# Patient Record
Sex: FEMALE | NOT HISPANIC OR LATINO | Employment: OTHER | URBAN - METROPOLITAN AREA
[De-identification: names, ages, dates, MRNs, and addresses within clinical notes are randomized per-mention and may not be internally consistent; named-entity substitution may affect disease eponyms.]

---

## 2022-08-03 ENCOUNTER — MEDICAL CORRESPONDENCE (OUTPATIENT)
Dept: HEALTH INFORMATION MANAGEMENT | Facility: CLINIC | Age: 67
End: 2022-08-03

## 2022-08-09 ENCOUNTER — TRANSCRIBE ORDERS (OUTPATIENT)
Dept: OTHER | Age: 67
End: 2022-08-09

## 2022-08-09 DIAGNOSIS — M65.341 TRIGGER RING FINGER OF RIGHT HAND: Primary | ICD-10-CM

## 2022-08-15 NOTE — TELEPHONE ENCOUNTER
DIAGNOSIS: complex trigger finger, right hand    APPOINTMENT DATE: 09/16/2022   NOTES STATUS DETAILS   OFFICE NOTE from referring provider Care Everywhere 08/09/2022 [facility]  08/03/2022 [facility]   OFFICE NOTE from other specialist N/A    DISCHARGE SUMMARY from hospital N/A    DISCHARGE REPORT from the ER N/A    OPERATIVE REPORT N/A    MEDICATION LIST N/A    EMG (for Spine) N/A    IMPLANT RECORD/STICKER N/A    LABS     CBC/DIFF N/A    CULTURES N/A    INJECTIONS DONE IN RADIOLOGY N/A    MRI N/A    CT SCAN N/A    XRAYS (IMAGES & REPORTS) N/A    TUMOR     PATHOLOGY  Slides & report N/A

## 2022-09-16 ENCOUNTER — PRE VISIT (OUTPATIENT)
Dept: ORTHOPEDICS | Facility: CLINIC | Age: 67
End: 2022-09-16

## 2022-09-16 ENCOUNTER — OFFICE VISIT (OUTPATIENT)
Dept: ORTHOPEDICS | Facility: CLINIC | Age: 67
End: 2022-09-16
Payer: COMMERCIAL

## 2022-09-16 ENCOUNTER — TELEPHONE (OUTPATIENT)
Dept: ORTHOPEDICS | Facility: CLINIC | Age: 67
End: 2022-09-16

## 2022-09-16 DIAGNOSIS — M65.341 TRIGGER RING FINGER OF RIGHT HAND: ICD-10-CM

## 2022-09-16 PROCEDURE — 99204 OFFICE O/P NEW MOD 45 MIN: CPT | Performed by: STUDENT IN AN ORGANIZED HEALTH CARE EDUCATION/TRAINING PROGRAM

## 2022-09-16 RX ORDER — IBUPROFEN 200 MG
800 TABLET ORAL EVERY 8 HOURS PRN
COMMUNITY

## 2022-09-16 ASSESSMENT — PAIN SCALES - GENERAL: PAINLEVEL: MODERATE PAIN (5)

## 2022-09-16 NOTE — LETTER
9/16/2022         RE: Joann Taveras  [address] [address]        Dear Colleague,    Thank you for referring your patient, Joann Taveras, to the [facility]. Please see a copy of my visit note below.    Ortho Hand    HPI: 67 year old RHD NS p/w R RF triggering. This has been ongoing for >5 years. She had 2-3 prior steroid injections, with the most recent >1 year ago. The triggering has returned. It is affecting her ability to  items. She seeks an opinion.     ROS: Negative, see HPI  PMH: None, nondiabetic  PSH: No prior surgeries to the hands or wrists  Medications: No blood thinners  Allergies: None  SH: Nonsmoker, denies any tobacco or nicotine use  FH: No bleeding or clotting issues, or problems with anesthesia    Examination:  Nonlabored breathing  Not distressed  R RF A1 pulley exquisite tenderness with grade II triggering  No R digit extensor subluxation  No Dupuytren's disease    A/P: 67 year old RHD NS p/w R RF triggering    -Discussed the etiology, natural history and treatment options for trigger finger. Options for treatment include stretching, massage, steroid injection, surgery. Since the patient has failed conservative management for years, including several steroid injections, it is reasonable to proceed with surgery. Patient would prefer to have surgery.   -Discussed the risks of surgery, including but not limited to: infection, bleeding, pain, poor scarring, injury to nerves or tendons, neuroma formation, recurrence, need for revision surgery, wound healing issues, complex regional pain syndrome. Despite these risks, patient consents to and would like to proceed with scheduling of RIGHT ring finger A1 pulley release.   -MAC sedation and local anesthesia   -Will place case request. Patient seeks surgery later in the year.   -A total of 45 minutes was devoted to review of chart, direct face-to-face patient counseling and documentation during  this encounter    Carroll Maxwell MD, PhD        Again, thank you for allowing me to participate in the care of your patient.        Sincerely,        Carroll Maxwell MD

## 2022-09-16 NOTE — TELEPHONE ENCOUNTER
Procedure: Right ring finger trigger release  Facility: [facility]  Length: 45? minutes  Anesthesia: Local, MAC  Post-op appointments needed: 2 weeks provider or with Renée, 6 weeks with provider only.  Surgery packet/instructions given to patient?  Yes , but not reviewed    Elton Kim RN

## 2022-09-16 NOTE — NURSING NOTE
Chief Complaint   Patient presents with     Right Hand - Pain, New Patient       There were no vitals filed for this visit.    There is no height or weight on file to calculate BMI.      TONI Kamara NREMT

## 2022-09-17 NOTE — PROGRESS NOTES
Ortho Hand    HPI: 67 year old RHD NS p/w R RF triggering. This has been ongoing for >5 years. She had 2-3 prior steroid injections, with the most recent >1 year ago. The triggering has returned. It is affecting her ability to  items. She seeks an opinion.     ROS: Negative, see HPI  PMH: None, nondiabetic  PSH: No prior surgeries to the hands or wrists  Medications: No blood thinners  Allergies: None  SH: Nonsmoker, denies any tobacco or nicotine use  FH: No bleeding or clotting issues, or problems with anesthesia    Examination:  Nonlabored breathing  Not distressed  R RF A1 pulley exquisite tenderness with grade II triggering  No R digit extensor subluxation  No Dupuytren's disease    A/P: 67 year old RHD NS p/w R RF triggering    -Discussed the etiology, natural history and treatment options for trigger finger. Options for treatment include stretching, massage, steroid injection, surgery. Since the patient has failed conservative management for years, including several steroid injections, it is reasonable to proceed with surgery. Patient would prefer to have surgery.   -Discussed the risks of surgery, including but not limited to: infection, bleeding, pain, poor scarring, injury to nerves or tendons, neuroma formation, recurrence, need for revision surgery, wound healing issues, complex regional pain syndrome. Despite these risks, patient consents to and would like to proceed with scheduling of RIGHT ring finger A1 pulley release.   -MAC sedation and local anesthesia   -Will place case request. Patient seeks surgery later in the year.   -A total of 45 minutes was devoted to review of chart, direct face-to-face patient counseling and documentation during this encounter    Carroll Maxwell MD, PhD

## 2022-09-19 PROBLEM — M65.341 TRIGGER RING FINGER OF RIGHT HAND: Status: ACTIVE | Noted: 2022-09-19

## 2022-09-19 NOTE — TELEPHONE ENCOUNTER
Spoke with patient and scheduled surgery for 12/21 in  - patient choice of date.    H&P with PCP.    COVID test to be done 1-2 days before the surgery.    Post-op scheduled for 1/6.    Surg packet given in clinic - not reviewed.     No further questions/cocnerns at this time.

## 2022-12-08 ENCOUNTER — TELEPHONE (OUTPATIENT)
Dept: SURGERY | Facility: CLINIC | Age: 67
End: 2022-12-08

## 2022-12-08 NOTE — TELEPHONE ENCOUNTER
Message left for the patient to call back to reschedule surgery date or location.  Maria G Youssef, Surgery Scheduling Coordinator

## 2022-12-22 ENCOUNTER — ANESTHESIA EVENT (OUTPATIENT)
Dept: SURGERY | Facility: AMBULATORY SURGERY CENTER | Age: 67
End: 2022-12-22
Payer: COMMERCIAL

## 2022-12-22 NOTE — ANESTHESIA PREPROCEDURE EVALUATION
Anesthesia Pre-Procedure Evaluation    Patient: Joann Taveras   MRN: [id number] : 1955        Procedure : Procedure(s):  RIGHT RING FINGER A1 PULLEY RELEASE          History reviewed. No pertinent past medical history.   Past Surgical History:   Procedure Laterality Date     ORTHOPEDIC SURGERY        Allergies   Allergen Reactions     Penicillins Hives and Rash      Social History     Tobacco Use     Smoking status: Never     Smokeless tobacco: Never   Substance Use Topics     Alcohol use: Yes      Wt Readings from Last 1 Encounters:   22 68 kg (149 lb 14.6 oz)        Anesthesia Evaluation   Pt has had prior anesthetic. Type: General.    No history of anesthetic complications       ROS/MED HX  ENT/Pulmonary:    (-) sleep apnea   Neurologic:    (-) no seizures and no CVA   Cardiovascular:    (-) hypertension and murmur   METS/Exercise Tolerance: >4 METS    Hematologic:       Musculoskeletal:       GI/Hepatic:    (-) GERD and liver disease   Renal/Genitourinary:    (-) renal disease   Endo:    (-) Type II DM   Psychiatric/Substance Use:       Infectious Disease:       Malignancy:       Other:            Physical Exam    Airway        Mallampati: I   TM distance: > 3 FB   Neck ROM: full   Mouth opening: > 3 cm    Respiratory Devices and Support         Dental  no notable dental history         Cardiovascular          Rhythm and rate: regular and normal (-) no murmur    Pulmonary   pulmonary exam normal        breath sounds clear to auscultation           OUTSIDE LABS:  CBC: No results found for: WBC, HGB, HCT, PLT  BMP: No results found for: NA, POTASSIUM, CHLORIDE, CO2, BUN, CR, GLC  COAGS: No results found for: PTT, INR, FIBR  POC: No results found for: BGM, HCG, HCGS  HEPATIC: No results found for: ALBUMIN, PROTTOTAL, ALT, AST, GGT, ALKPHOS, BILITOTAL, BILIDIRECT, MARYJANE  OTHER: No results found for: PH, LACT, A1C, PALMIRA, PHOS, MAG, LIPASE, AMYLASE, TSH, T4, T3, CRP, SED    Anesthesia Plan    ASA  Status:  1   NPO Status:  NPO Appropriate    Anesthesia Type: MAC.     - Reason for MAC: immobility needed   Induction: Intravenous, Propofol.   Maintenance: TIVA.        Consents    Anesthesia Plan(s) and associated risks, benefits, and realistic alternatives discussed. Questions answered and patient/representative(s) expressed understanding.    - Discussed:     - Discussed with:  Patient      - Extended Intubation/Ventilatory Support Discussed: No.      - Patient is DNR/DNI Status: No    Use of blood products discussed: No .     Postoperative Care    Pain management: IV analgesics.   PONV prophylaxis: Ondansetron (or other 5HT-3), Background Propofol Infusion     Comments:    Other Comments: Discussed plan for IV anesthetic with native airway. Discussed potential need for conversion to general anesthetic with airway management and potential for recall.              Jeff Fishman MD

## 2022-12-23 ENCOUNTER — HOSPITAL ENCOUNTER (OUTPATIENT)
Facility: AMBULATORY SURGERY CENTER | Age: 67
Discharge: HOME | End: 2022-12-23
Attending: STUDENT IN AN ORGANIZED HEALTH CARE EDUCATION/TRAINING PROGRAM | Admitting: STUDENT IN AN ORGANIZED HEALTH CARE EDUCATION/TRAINING PROGRAM
Payer: COMMERCIAL

## 2022-12-23 ENCOUNTER — ANESTHESIA (OUTPATIENT)
Dept: SURGERY | Facility: AMBULATORY SURGERY CENTER | Age: 67
End: 2022-12-23
Payer: COMMERCIAL

## 2022-12-23 VITALS
DIASTOLIC BLOOD PRESSURE: 66 MMHG | SYSTOLIC BLOOD PRESSURE: 109 MMHG | RESPIRATION RATE: 16 BRPM | OXYGEN SATURATION: 95 % | WEIGHT: 149.91 LBS | HEART RATE: 68 BPM | TEMPERATURE: 97 F

## 2022-12-23 DIAGNOSIS — M65.341 TRIGGER RING FINGER OF RIGHT HAND: ICD-10-CM

## 2022-12-23 PROCEDURE — G8907 PT DOC NO EVENTS ON DISCHARG: HCPCS

## 2022-12-23 PROCEDURE — G8918 PT W/O PREOP ORDER IV AB PRO: HCPCS

## 2022-12-23 PROCEDURE — 26055 INCISE FINGER TENDON SHEATH: CPT | Mod: F8 | Performed by: STUDENT IN AN ORGANIZED HEALTH CARE EDUCATION/TRAINING PROGRAM

## 2022-12-23 PROCEDURE — 26055 INCISE FINGER TENDON SHEATH: CPT | Mod: F8

## 2022-12-23 RX ORDER — ONDANSETRON 2 MG/ML
4 INJECTION INTRAMUSCULAR; INTRAVENOUS EVERY 30 MIN PRN
Status: DISCONTINUED | OUTPATIENT
Start: 2022-12-23 | End: 2022-12-24 | Stop reason: HOSPADM

## 2022-12-23 RX ORDER — FENTANYL CITRATE 50 UG/ML
50 INJECTION, SOLUTION INTRAMUSCULAR; INTRAVENOUS EVERY 5 MIN PRN
Status: DISCONTINUED | OUTPATIENT
Start: 2022-12-23 | End: 2022-12-24 | Stop reason: HOSPADM

## 2022-12-23 RX ORDER — LIDOCAINE HYDROCHLORIDE 20 MG/ML
INJECTION, SOLUTION INFILTRATION; PERINEURAL PRN
Status: DISCONTINUED | OUTPATIENT
Start: 2022-12-23 | End: 2022-12-23

## 2022-12-23 RX ORDER — ONDANSETRON 2 MG/ML
INJECTION INTRAMUSCULAR; INTRAVENOUS PRN
Status: DISCONTINUED | OUTPATIENT
Start: 2022-12-23 | End: 2022-12-23

## 2022-12-23 RX ORDER — PROPOFOL 10 MG/ML
INJECTION, EMULSION INTRAVENOUS PRN
Status: DISCONTINUED | OUTPATIENT
Start: 2022-12-23 | End: 2022-12-23

## 2022-12-23 RX ORDER — ONDANSETRON 4 MG/1
4 TABLET, ORALLY DISINTEGRATING ORAL EVERY 30 MIN PRN
Status: DISCONTINUED | OUTPATIENT
Start: 2022-12-23 | End: 2022-12-24 | Stop reason: HOSPADM

## 2022-12-23 RX ORDER — KETOROLAC TROMETHAMINE 30 MG/ML
INJECTION, SOLUTION INTRAMUSCULAR; INTRAVENOUS PRN
Status: DISCONTINUED | OUTPATIENT
Start: 2022-12-23 | End: 2022-12-23

## 2022-12-23 RX ORDER — ACETAMINOPHEN 325 MG/1
975 TABLET ORAL ONCE
Status: COMPLETED | OUTPATIENT
Start: 2022-12-23 | End: 2022-12-23

## 2022-12-23 RX ORDER — OXYCODONE HYDROCHLORIDE 5 MG/1
5 TABLET ORAL EVERY 4 HOURS PRN
Status: DISCONTINUED | OUTPATIENT
Start: 2022-12-23 | End: 2022-12-24 | Stop reason: HOSPADM

## 2022-12-23 RX ORDER — HYDRALAZINE HYDROCHLORIDE 20 MG/ML
2.5-5 INJECTION INTRAMUSCULAR; INTRAVENOUS EVERY 10 MIN PRN
Status: DISCONTINUED | OUTPATIENT
Start: 2022-12-23 | End: 2022-12-24 | Stop reason: HOSPADM

## 2022-12-23 RX ORDER — FENTANYL CITRATE 50 UG/ML
25 INJECTION, SOLUTION INTRAMUSCULAR; INTRAVENOUS
Status: DISCONTINUED | OUTPATIENT
Start: 2022-12-23 | End: 2022-12-24 | Stop reason: HOSPADM

## 2022-12-23 RX ORDER — PROPOFOL 10 MG/ML
INJECTION, EMULSION INTRAVENOUS CONTINUOUS PRN
Status: DISCONTINUED | OUTPATIENT
Start: 2022-12-23 | End: 2022-12-23

## 2022-12-23 RX ORDER — METOPROLOL TARTRATE 1 MG/ML
1-2 INJECTION, SOLUTION INTRAVENOUS EVERY 5 MIN PRN
Status: DISCONTINUED | OUTPATIENT
Start: 2022-12-23 | End: 2022-12-24 | Stop reason: HOSPADM

## 2022-12-23 RX ORDER — LIDOCAINE 40 MG/G
CREAM TOPICAL
Status: DISCONTINUED | OUTPATIENT
Start: 2022-12-23 | End: 2022-12-24 | Stop reason: HOSPADM

## 2022-12-23 RX ORDER — SODIUM CHLORIDE, SODIUM LACTATE, POTASSIUM CHLORIDE, CALCIUM CHLORIDE 600; 310; 30; 20 MG/100ML; MG/100ML; MG/100ML; MG/100ML
INJECTION, SOLUTION INTRAVENOUS CONTINUOUS
Status: DISCONTINUED | OUTPATIENT
Start: 2022-12-23 | End: 2022-12-24 | Stop reason: HOSPADM

## 2022-12-23 RX ORDER — FENTANYL CITRATE 50 UG/ML
25 INJECTION, SOLUTION INTRAMUSCULAR; INTRAVENOUS EVERY 5 MIN PRN
Status: DISCONTINUED | OUTPATIENT
Start: 2022-12-23 | End: 2022-12-24 | Stop reason: HOSPADM

## 2022-12-23 RX ORDER — OXYCODONE HYDROCHLORIDE 5 MG/1
10 TABLET ORAL EVERY 4 HOURS PRN
Status: DISCONTINUED | OUTPATIENT
Start: 2022-12-23 | End: 2022-12-24 | Stop reason: HOSPADM

## 2022-12-23 RX ORDER — FENTANYL CITRATE 50 UG/ML
INJECTION, SOLUTION INTRAMUSCULAR; INTRAVENOUS PRN
Status: DISCONTINUED | OUTPATIENT
Start: 2022-12-23 | End: 2022-12-23

## 2022-12-23 RX ADMIN — ACETAMINOPHEN 975 MG: 325 TABLET ORAL at 10:58

## 2022-12-23 RX ADMIN — SODIUM CHLORIDE, SODIUM LACTATE, POTASSIUM CHLORIDE, CALCIUM CHLORIDE: 600; 310; 30; 20 INJECTION, SOLUTION INTRAVENOUS at 12:10

## 2022-12-23 RX ADMIN — ONDANSETRON 4 MG: 2 INJECTION INTRAMUSCULAR; INTRAVENOUS at 12:26

## 2022-12-23 RX ADMIN — LIDOCAINE HYDROCHLORIDE 60 MG: 20 INJECTION, SOLUTION INFILTRATION; PERINEURAL at 12:08

## 2022-12-23 RX ADMIN — PROPOFOL 150 MCG/KG/MIN: 10 INJECTION, EMULSION INTRAVENOUS at 12:08

## 2022-12-23 RX ADMIN — PROPOFOL 60 MG: 10 INJECTION, EMULSION INTRAVENOUS at 12:08

## 2022-12-23 RX ADMIN — KETOROLAC TROMETHAMINE 30 MG: 30 INJECTION, SOLUTION INTRAMUSCULAR; INTRAVENOUS at 12:26

## 2022-12-23 RX ADMIN — FENTANYL CITRATE 25 MCG: 50 INJECTION, SOLUTION INTRAMUSCULAR; INTRAVENOUS at 12:08

## 2022-12-23 ASSESSMENT — ENCOUNTER SYMPTOMS: SEIZURES: 0

## 2022-12-23 NOTE — OP NOTE
HAND SURGERY OPERATIVE REPORT     Date of Surgery: 12/23/2022  Surgical Service: Ortho Hand     Preoperative Diagnosis: Right ring finger triggering     Postoperative Diagnosis: Same as preoperative diagnoses     Procedures Performed: Right ring finger A1 pulley release      Attending:  SATHYA Maxwell MD, PhD  Assistant: ANTELMO Krause MD     Complications: None apparent  Specimens: None  Implants: None  Estimated blood loss: < 5 mL  Tourniquet time: 4 minutes  Wound classification: Clean  Anesthesia: MAC with local     Indications for Procedure: This is a 67-year-old with right ring finger triggering refractory to conservative management. Patient would like to undergo surgery to relieve the triggering. No changes in history or exam.      Intraoperative findings: Right ring finger A1 pulley was fully released. 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 Digital nerves were identified and protected throughout.      Description of Procedure: Patient was seen in the preoperative holding area.  Consent was verified.  The right ring finger was marked.  All additional questions were answered.  The risks of the surgery were reiterated, including (but not limited to): infection, bleeding, scarring, pain, injury to surrounding structures including nerves and tendons, need for additional revision surgery, neuroma formation, complex regional pain syndrome, stiffness, incomplete release, recurrence of triggering.  Following discussion of all these risks, the patient again agreed to proceed with surgery.  Patient was then transferred to the operating room and placed supine on the operating table.  All pressure points were padded.  Sequential compression devices were placed on bilateral lower extremities and verified to be operational.  Preinduction timeout was performed.  Monitored anesthesia care was commenced.  The right hand was prepped and draped in usual sterile fashion. The forearm tourniquet was inflated to 250 mm  "Hg. Next, a longitudinally-oriented incision was made over the A1 pulley at the base of the right ring finger. Once through skin, tenotomies were used to dissect down onto the A1 pulley. The digital nerves were visualized and gently retracted out of the way. A Ray-Nathaly was used to sweep the fat off of the flexor sheath. Once exposed, the A1 pulley was longitudinally and fully divided under direct visualization with a #15 blade. Once done, the right ring finger could be fully extended with no issues. Next, a Ragnell was use to pull the flexor tendons out of the wound to gently perform a traction tenolysis along with confirming full release. Next, the tourniquet was taken down. The surgical wound was irrigated with sterile saline. The skin was closed with 4-0 Nylon interrupted horizontal mattress suture with care taken to take only superficial bites and skin-only. The incision was dressed with Xeroform, bacitracin, 4 x 4\" gauze, cast padding and a 2\" inch ACE bandage. Patient woke up, tolerated the procedure and was transferred to the PACU with no events.      Postoperative plan: Clinic in 10-14 days.     Carroll Maxwell MD, PhD  "

## 2022-12-23 NOTE — DISCHARGE INSTRUCTIONS
While you were at the hospital today you received 975 mg Tylenol at 11am. Maximum daily dosage is 4000 mg.    [facility]  Same-Day Surgery   Adult Discharge Orders & Instructions   For 24 hours after surgery  Get plenty of rest.  A responsible adult must stay with you for at least 24 hours after you leave the hospital.   Do not drive or use heavy equipment.  If you have weakness or tingling, don't drive or use heavy equipment until this feeling goes away.  Do not drink alcohol.  Avoid strenuous or risky activities.  Ask for help when climbing stairs.   You may feel lightheaded.  IF so, sit for a few minutes before standing.  Have someone help you get up.   If you have nausea (feel sick to your stomach): Drink only clear liquids such as apple juice, ginger ale, broth or 7-Up.  Rest may also help.  Be sure to drink enough fluids.  Move to a regular diet as you feel able.  You may have a slight fever. Call the doctor if your fever is over 100 F (37.7 C) (taken under the tongue) or lasts longer than 24 hours.  You may have a dry mouth, a sore throat, muscle aches or trouble sleeping.  These should go away after 24 hours.  Do not make important or legal decisions.   Call your doctor for any of the followin.  Signs of infection (fever, growing tenderness at the surgery site, a large amount of drainage or bleeding, severe pain, foul-smelling drainage, redness, swelling).  2. It has been over 8 to 10 hours since surgery and you are still not able to urinate (pass water)    Hand Surgery Discharge Instructions    Patient has been treated for right ring finger triggering with Dr. Maxwell on 2022.     Care: Please keep the splint/cast/dressing clean and dry at all times. Should it get wet, please call the clinic to schedule an appointment - as it may need to be replaced. Do not hesitate to use the sling to help protect the affected extremity and in particular in the setting of a nerve  block.     Medications: You can take Ibuprofen 400-800 mg and Tylenol 650 mg for pain relief. Please take each every 6 hours, and for optimal pain relief - please stagger the medications so that you are taking one or the other every 3 hours. If you had a nerve block, the effects may last 8-12 hours. If you have been prescribed additional pain medications, please take as instructed and as needed. If you are taking additional pain medications, please do not exceed 4000 mg of Tylenol daily from all sources. Also, if you are taking narcotic medications, please do not operate heavy machinery or drive. If you have been prescribed antibiotics, please also take as instructed.     Diet: Start with clear liquids and slow down if nauseated. Advance the diet as tolerated.    Weight-bearing/Activities: Move your fingers to prevent stiffness. Elevate the affected extremity to improve throbbing sensation or pain. No strenuous activities and do not raise your heart rate above 100 bpm for the first few weeks. If you had a fracture fixed, your extremity is non-weight-bearing. Otherwise, you can limit your weight-bearing with the affected extremity to 2-3 pounds unless otherwise specified.    ER Instructions: Please call the office and/or consider return to the ER if you experience worsening pain not relieved by medications, increased swelling, redness or high fevers >101F or if there are unexpected problems like shortness of breath.    Post-op follow-up: Clinic in 10-14 days with Dr. Maxwell or PA at the [facility]    Carroll Maxwell MD, PhD

## 2022-12-23 NOTE — ANESTHESIA CARE TRANSFER NOTE
Patient: Joann Taveras    Procedure: Procedure(s):  RIGHT RING FINGER A1 PULLEY RELEASE       Diagnosis: Trigger ring finger of right hand [M65.341]  Diagnosis Additional Information: No value filed.    Anesthesia Type:   MAC     Note:    Oropharynx: oropharynx clear of all foreign objects  Level of Consciousness: awake  Oxygen Supplementation: room air    Independent Airway: airway patency satisfactory and stable  Dentition: dentition unchanged  Vital Signs Stable: post-procedure vital signs reviewed and stable  Report to RN Given: handoff report given  Patient transferred to: Phase II    Handoff Report: Identifed the Patient, Identified the Reponsible Provider, Reviewed the pertinent medical history, Discussed the surgical course, Reviewed Intra-OP anesthesia mangement and issues during anesthesia, Set expectations for post-procedure period and Allowed opportunity for questions and acknowledgement of understanding      Vitals:  Vitals Value Taken Time   BP     Temp     Pulse     Resp     SpO2 92 % 12/23/22 1235   Vitals shown include unvalidated device data.    Electronically Signed By: NINA Mohr CRNA  December 23, 2022  12:36 PM

## 2022-12-23 NOTE — ANESTHESIA POSTPROCEDURE EVALUATION
Patient: Joann Taveras    Procedure: Procedure(s):  RIGHT RING FINGER A1 PULLEY RELEASE       Anesthesia Type:  MAC    Note:  Disposition: Outpatient   Postop Pain Control: Uneventful            Sign Out: Well controlled pain   PONV: No   Neuro/Psych: Uneventful            Sign Out: Acceptable/Baseline neuro status   Airway/Respiratory: Uneventful            Sign Out: Acceptable/Baseline resp. status   CV/Hemodynamics: Uneventful            Sign Out: Acceptable CV status; No obvious hypovolemia; No obvious fluid overload   Other NRE:    DID A NON-ROUTINE EVENT OCCUR? No           Last vitals:  Vitals Value Taken Time   /66 12/23/22 1300   Temp 97  F (36.1  C) 12/23/22 1300   Pulse 68 12/23/22 1300   Resp 16 12/23/22 1300   SpO2 94 % 12/23/22 1301   Vitals shown include unvalidated device data.    Electronically Signed By: Jeff Fishman MD  December 23, 2022  2:07 PM

## 2023-01-06 ENCOUNTER — OFFICE VISIT (OUTPATIENT)
Dept: ORTHOPEDICS | Facility: CLINIC | Age: 68
End: 2023-01-06
Payer: COMMERCIAL

## 2023-01-06 DIAGNOSIS — M65.341 TRIGGER RING FINGER OF RIGHT HAND: Primary | ICD-10-CM

## 2023-01-06 PROCEDURE — 99024 POSTOP FOLLOW-UP VISIT: CPT | Performed by: STUDENT IN AN ORGANIZED HEALTH CARE EDUCATION/TRAINING PROGRAM

## 2023-01-06 ASSESSMENT — PAIN SCALES - GENERAL: PAINLEVEL: NO PAIN (0)

## 2023-01-06 NOTE — NURSING NOTE
Chief Complaint   Patient presents with     Right Hand - Surgical Followup, Pain       There were no vitals filed for this visit.    There is no height or weight on file to calculate BMI.      TONI Kamara NREMT

## 2023-01-06 NOTE — LETTER
1/6/2023         RE: Joann Taveras  [address] [facility] [address]        Dear Colleague,    Thank you for referring your patient, Joann Taveras, to the [facility]. Please see a copy of my visit note below.    Ortho Hand    No issues.  Has not been moving her fingers very much.  Feels that the surgery helped her.    On exam, the right palm incision is intact with no signs of infection.  Right ring finger without active triggering.  Right ring finger with normal radial and ulnar tip sensation.    A/P: 67-year-old female 2 weeks status post right ring finger A1 pulley release, doing well    -Sutures out today  -Steri-Strips placed  -Patient can initiate scar treatment with silicone-based ointment or Bio-oil next week.    -Limit lifting to 7-10 pounds for the next 2 weeks, followed by 15-20 pounds for postoperative weeks 5-6  -Instructed patient to use the fingers to prevent stiffness and to perform passive motion exercises  -Return to clinic in 4 weeks for reevaluation or as needed    Carroll Maxwell MD, PhD        Again, thank you for allowing me to participate in the care of your patient.        Sincerely,        Carroll Maxwell MD

## 2023-01-06 NOTE — PROGRESS NOTES
Ortho Hand    No issues.  Has not been moving her fingers very much.  Feels that the surgery helped her.    On exam, the right palm incision is intact with no signs of infection.  Right ring finger without active triggering.  Right ring finger with normal radial and ulnar tip sensation.    A/P: 67-year-old female 2 weeks status post right ring finger A1 pulley release, doing well    -Sutures out today  -Steri-Strips placed  -Patient can initiate scar treatment with silicone-based ointment or Bio-oil next week.    -Limit lifting to 7-10 pounds for the next 2 weeks, followed by 15-20 pounds for postoperative weeks 5-6  -Instructed patient to use the fingers to prevent stiffness and to perform passive motion exercises  -Return to clinic in 4 weeks for reevaluation or as needed    Carroll Maxwell MD, PhD

## (undated) DEVICE — PREP CHLORAPREP 26ML TINTED ORANGE  260815

## (undated) DEVICE — BNDG ESMARK 4" STERILE

## (undated) DEVICE — SOL WATER IRRIG 1000ML BOTTLE 07139-09

## (undated) DEVICE — SYR 10ML LL W/O NDL

## (undated) DEVICE — NDL 19GA 1.5"

## (undated) DEVICE — NDL 25GA 1.5" 305127

## (undated) DEVICE — DRAPE STERI TOWEL SM 1000

## (undated) DEVICE — SOL NACL 0.9% IRRIG 1000ML BOTTLE 07138-09

## (undated) DEVICE — BNDG KLING 2" 2231

## (undated) DEVICE — DRSG XEROFORM 1X8"

## (undated) DEVICE — PACK HAND WRIST SOP15HWFSP

## (undated) DEVICE — TAPE DURAPORE 2" SILK 1538-2

## (undated) DEVICE — BNDG ELASTIC 2"X5YDS UNSTERILE 6611-20

## (undated) DEVICE — GLOVE PROTEXIS POWDER FREE 7.5 ORTHOPEDIC 2D73ET75

## (undated) DEVICE — SU ETHILON 4-0 PS-2 18" 1667G

## (undated) RX ORDER — ONDANSETRON 2 MG/ML
INJECTION INTRAMUSCULAR; INTRAVENOUS
Status: DISPENSED
Start: 2022-12-23

## (undated) RX ORDER — KETOROLAC TROMETHAMINE 30 MG/ML
INJECTION, SOLUTION INTRAMUSCULAR; INTRAVENOUS
Status: DISPENSED
Start: 2022-12-23

## (undated) RX ORDER — FENTANYL CITRATE 50 UG/ML
INJECTION, SOLUTION INTRAMUSCULAR; INTRAVENOUS
Status: DISPENSED
Start: 2022-12-23

## (undated) RX ORDER — BUPIVACAINE HYDROCHLORIDE 5 MG/ML
INJECTION, SOLUTION EPIDURAL; INTRACAUDAL
Status: DISPENSED
Start: 2022-12-23

## (undated) RX ORDER — ACETAMINOPHEN 325 MG/1
TABLET ORAL
Status: DISPENSED
Start: 2022-12-23

## (undated) RX ORDER — LIDOCAINE HYDROCHLORIDE 10 MG/ML
INJECTION, SOLUTION EPIDURAL; INFILTRATION; INTRACAUDAL; PERINEURAL
Status: DISPENSED
Start: 2022-12-23